# Patient Record
Sex: MALE | Race: WHITE | Employment: STUDENT | URBAN - METROPOLITAN AREA
[De-identification: names, ages, dates, MRNs, and addresses within clinical notes are randomized per-mention and may not be internally consistent; named-entity substitution may affect disease eponyms.]

---
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
## 2018-07-20 ENCOUNTER — TELEPHONE (OUTPATIENT)
Dept: FAMILY MEDICINE CLINIC | Facility: CLINIC | Age: 18
End: 2018-07-20

## 2018-07-20 NOTE — TELEPHONE ENCOUNTER
Pts mother called requesting referral for Dr. Elvira Ayala for Scoliosis.  Pts mother would like a call back from medical staff

## 2019-07-17 ENCOUNTER — TELEPHONE (OUTPATIENT)
Dept: FAMILY MEDICINE CLINIC | Facility: CLINIC | Age: 19
End: 2019-07-17

## 2019-07-18 NOTE — TELEPHONE ENCOUNTER
Spoke with mom states he was at ER last night at [facility] for sore throat.  They ran strep test but came back negative, awaiting lab culture, wanted to see Dr Mat Carranza tomorrow for f/u at 11:00 am.

## 2019-08-07 ENCOUNTER — OFFICE VISIT (OUTPATIENT)
Dept: FAMILY MEDICINE CLINIC | Facility: CLINIC | Age: 19
End: 2019-08-07
Payer: MEDICAID

## 2019-08-07 VITALS
SYSTOLIC BLOOD PRESSURE: 114 MMHG | HEART RATE: 57 BPM | DIASTOLIC BLOOD PRESSURE: 74 MMHG | HEIGHT: 69 IN | WEIGHT: 149.38 LBS | TEMPERATURE: 98 F | BODY MASS INDEX: 22.13 KG/M2

## 2019-08-07 DIAGNOSIS — G89.29 CHRONIC BILATERAL LOW BACK PAIN WITHOUT SCIATICA: Primary | ICD-10-CM

## 2019-08-07 DIAGNOSIS — M54.50 CHRONIC BILATERAL LOW BACK PAIN WITHOUT SCIATICA: Primary | ICD-10-CM

## 2019-08-07 DIAGNOSIS — Z88.9 MULTIPLE ALLERGIES: ICD-10-CM

## 2019-08-07 PROCEDURE — 99214 OFFICE O/P EST MOD 30 MIN: CPT | Performed by: FAMILY MEDICINE

## 2019-08-07 RX ORDER — EPINEPHRINE 0.3 MG/.3ML
INJECTION SUBCUTANEOUS
Qty: 2 EACH | Refills: 0 | Status: SHIPPED | OUTPATIENT
Start: 2019-08-07 | End: 2020-05-29

## 2019-08-12 NOTE — PROGRESS NOTES
HPI:    Fabien Boyle is a 19year old male presents to clinic with concerns regarding back pain. Chronic issue for patient. Denies known trauma/injury.   Almost every morning, patient wakes up with lower back pain, it improves throughout the day but the reviewed. ASSESSMENT/PLAN:   (M54.5,  G89.29) Chronic bilateral low back pain without sciatica  (primary encounter diagnosis)  Plan: PHYSICAL THERAPY EXTERNAL  - normal physical exam. PT referral given. Will follow up if no improvement in 4-6 weeks.

## 2019-08-16 ENCOUNTER — APPOINTMENT (OUTPATIENT)
Dept: LAB | Age: 19
End: 2019-08-16
Attending: FAMILY MEDICINE
Payer: MEDICAID

## 2019-08-16 ENCOUNTER — OFFICE VISIT (OUTPATIENT)
Dept: FAMILY MEDICINE CLINIC | Facility: CLINIC | Age: 19
End: 2019-08-16
Payer: MEDICAID

## 2019-08-16 VITALS
HEART RATE: 59 BPM | RESPIRATION RATE: 18 BRPM | WEIGHT: 146.19 LBS | TEMPERATURE: 98 F | HEIGHT: 69 IN | BODY MASS INDEX: 21.65 KG/M2 | DIASTOLIC BLOOD PRESSURE: 73 MMHG | SYSTOLIC BLOOD PRESSURE: 120 MMHG

## 2019-08-16 DIAGNOSIS — Z00.00 ANNUAL PHYSICAL EXAM: Primary | ICD-10-CM

## 2019-08-16 PROCEDURE — 99395 PREV VISIT EST AGE 18-39: CPT | Performed by: FAMILY MEDICINE

## 2019-08-16 NOTE — PROGRESS NOTES
HPI:    Saumya Madera is a 19year old male presents to clinic for for an annual physical exam.   No acute concerns. .   Normal appetite. Balanced diet. Normal BMs and urination. Sleeps 5-7 hours a night.  Sexually active with multiple partners, uses condom respiratory distress. He has no wheezes. He has no rales. Abdominal: Soft. Bowel sounds are normal. He exhibits no distension. There is no tenderness. There is no rebound and no guarding. Lymphadenopathy:     He has no cervical adenopathy.    Neurologic

## 2020-05-29 ENCOUNTER — OFFICE VISIT (OUTPATIENT)
Dept: FAMILY MEDICINE CLINIC | Facility: CLINIC | Age: 20
End: 2020-05-29
Payer: MEDICAID

## 2020-05-29 VITALS
HEART RATE: 63 BPM | HEIGHT: 69 IN | TEMPERATURE: 97 F | SYSTOLIC BLOOD PRESSURE: 123 MMHG | WEIGHT: 150.25 LBS | DIASTOLIC BLOOD PRESSURE: 79 MMHG | BODY MASS INDEX: 22.25 KG/M2

## 2020-05-29 DIAGNOSIS — L73.9 FOLLICULITIS: Primary | ICD-10-CM

## 2020-05-29 DIAGNOSIS — Z91.010 PEANUT ALLERGY: ICD-10-CM

## 2020-05-29 PROCEDURE — 99214 OFFICE O/P EST MOD 30 MIN: CPT | Performed by: FAMILY MEDICINE

## 2020-05-29 RX ORDER — EPINEPHRINE 0.3 MG/.3ML
INJECTION SUBCUTANEOUS
Qty: 2 EACH | Refills: 0 | Status: SHIPPED | OUTPATIENT
Start: 2020-05-29

## 2020-05-29 NOTE — PROGRESS NOTES
HPI:    Mandeep Bragg is a 22year old male presents to clinic with concerns regarding lesion on his penis. About 1 month back, patient noticed a flat hard lesion on his penis.   Sometimes it appears like it has thick fluid inside that can be expressed, bu follicle. Patient advised against popping it open with a needle/blade. Advised warm compresses and gentle exfoliation.   Follow-up in 1 month if no improvement    (Z91.010) Peanut allergy  Plan:  -EpiPen refilled      Responsible party/patient verbalized

## 2020-12-23 ENCOUNTER — TELEPHONE (OUTPATIENT)
Dept: FAMILY MEDICINE CLINIC | Facility: CLINIC | Age: 20
End: 2020-12-23

## 2020-12-23 NOTE — TELEPHONE ENCOUNTER
Pt states he sees worms in his stool. Pt denies blood in stool, dark stool, diarrhea, fever, abdominal pain,    Pt states at times has nausea, denies emesis. Advised OV today and pt agrees to plan.     OV scheduled with Dr Isiah Sinclair for further eval

## 2020-12-23 NOTE — TELEPHONE ENCOUNTER
Patient cancelled appointment for 4:30pm today with Dr Shree Lee indicated that forgot had another appointment and would not make it in time. Advised patient that can go to urgent care to get evaluated today. Patient agreed.

## 2021-06-09 ENCOUNTER — OFFICE VISIT (OUTPATIENT)
Dept: FAMILY MEDICINE CLINIC | Facility: CLINIC | Age: 21
End: 2021-06-09
Payer: MEDICAID

## 2021-06-09 ENCOUNTER — LAB ENCOUNTER (OUTPATIENT)
Dept: LAB | Age: 21
End: 2021-06-09
Attending: FAMILY MEDICINE
Payer: MEDICAID

## 2021-06-09 VITALS
WEIGHT: 181 LBS | HEART RATE: 60 BPM | HEIGHT: 69 IN | DIASTOLIC BLOOD PRESSURE: 77 MMHG | BODY MASS INDEX: 26.81 KG/M2 | SYSTOLIC BLOOD PRESSURE: 118 MMHG

## 2021-06-09 DIAGNOSIS — Z00.00 ANNUAL PHYSICAL EXAM: Primary | ICD-10-CM

## 2021-06-09 DIAGNOSIS — Z86.19 HISTORY OF CHLAMYDIA: ICD-10-CM

## 2021-06-09 DIAGNOSIS — Z00.00 ANNUAL PHYSICAL EXAM: ICD-10-CM

## 2021-06-09 DIAGNOSIS — M41.9 SCOLIOSIS, UNSPECIFIED SCOLIOSIS TYPE, UNSPECIFIED SPINAL REGION: ICD-10-CM

## 2021-06-09 PROCEDURE — 3074F SYST BP LT 130 MM HG: CPT | Performed by: FAMILY MEDICINE

## 2021-06-09 PROCEDURE — 3078F DIAST BP <80 MM HG: CPT | Performed by: FAMILY MEDICINE

## 2021-06-09 PROCEDURE — 80061 LIPID PANEL: CPT

## 2021-06-09 PROCEDURE — 3008F BODY MASS INDEX DOCD: CPT | Performed by: FAMILY MEDICINE

## 2021-06-09 PROCEDURE — 90471 IMMUNIZATION ADMIN: CPT | Performed by: FAMILY MEDICINE

## 2021-06-09 PROCEDURE — 36415 COLL VENOUS BLD VENIPUNCTURE: CPT

## 2021-06-09 PROCEDURE — 99395 PREV VISIT EST AGE 18-39: CPT | Performed by: FAMILY MEDICINE

## 2021-06-09 PROCEDURE — 87591 N.GONORRHOEAE DNA AMP PROB: CPT

## 2021-06-09 PROCEDURE — 87491 CHLMYD TRACH DNA AMP PROBE: CPT

## 2021-06-09 PROCEDURE — 90651 9VHPV VACCINE 2/3 DOSE IM: CPT | Performed by: FAMILY MEDICINE

## 2021-06-09 PROCEDURE — 90472 IMMUNIZATION ADMIN EACH ADD: CPT | Performed by: FAMILY MEDICINE

## 2021-06-09 PROCEDURE — 80053 COMPREHEN METABOLIC PANEL: CPT

## 2021-06-09 PROCEDURE — 90715 TDAP VACCINE 7 YRS/> IM: CPT | Performed by: FAMILY MEDICINE

## 2021-06-09 NOTE — PROGRESS NOTES
HPI:    Saumya Madera is a 23year old male presents to clinic for annual physical exam.  History of scoliosis, gets frequent back pains during exercise. Is interested in seeing a physical therapist.  No other concerns.   Normal appetite, tries eat healthy equal, round, and reactive to light. Neck:      Thyroid: No thyromegaly. Cardiovascular:      Rate and Rhythm: Normal rate and regular rhythm. Heart sounds: Normal heart sounds. No murmur heard.      Pulmonary:      Effort: Pulmonary effort is norm PERTUSIS VACCINE (TDAP), >7 YEARS, IM USE  HPV HUMAN PAPILLOMA VIRUS VACC 9 JEAN 3 DOSE IM  PHYSICAL THERAPY - INTERNAL       This note was created by [employer] voice recognition.  Errors in content may be related to improper recognition by the system; efforts

## 2021-07-19 ENCOUNTER — MED REC SCAN ONLY (OUTPATIENT)
Dept: FAMILY MEDICINE CLINIC | Facility: CLINIC | Age: 21
End: 2021-07-19

## 2021-07-26 ENCOUNTER — NURSE TRIAGE (OUTPATIENT)
Dept: FAMILY MEDICINE CLINIC | Facility: CLINIC | Age: 21
End: 2021-07-26

## 2021-07-26 NOTE — TELEPHONE ENCOUNTER
Action Requested: Summary for Provider     []  Critical Lab, Recommendations Needed  [x] Need Additional Advice  []   FYI    []   Need Orders  [] Need Medications Sent to Pharmacy  []  Other     SUMMARY: Patient (with mom also on the line) stated that he n

## 2021-07-29 ENCOUNTER — OFFICE VISIT (OUTPATIENT)
Dept: FAMILY MEDICINE CLINIC | Facility: CLINIC | Age: 21
End: 2021-07-29
Payer: MEDICAID

## 2021-07-29 VITALS
RESPIRATION RATE: 16 BRPM | HEART RATE: 50 BPM | WEIGHT: 173 LBS | BODY MASS INDEX: 26 KG/M2 | TEMPERATURE: 98 F | SYSTOLIC BLOOD PRESSURE: 114 MMHG | DIASTOLIC BLOOD PRESSURE: 72 MMHG

## 2021-07-29 DIAGNOSIS — R19.5 CHANGE IN STOOL: Primary | ICD-10-CM

## 2021-07-29 LAB — IGA SERPL-MCNC: 139 MG/DL (ref 70–312)

## 2021-07-29 PROCEDURE — 3078F DIAST BP <80 MM HG: CPT | Performed by: FAMILY MEDICINE

## 2021-07-29 PROCEDURE — 3074F SYST BP LT 130 MM HG: CPT | Performed by: FAMILY MEDICINE

## 2021-07-29 PROCEDURE — 99213 OFFICE O/P EST LOW 20 MIN: CPT | Performed by: FAMILY MEDICINE

## 2021-07-29 NOTE — PROGRESS NOTES
HPI: Bhavesh Camarillo is a 23year old male who presents for complaints of worms in his stool. Noticed about one week ago. Has seen them a few times since then. Notices them more after a night of drinking. Not mobile. Sees them in the toilet bowl.  No blood in stool

## 2021-07-30 LAB — TTG IGA SER-ACNC: 0.2 U/ML (ref ?–7)

## 2021-08-17 ENCOUNTER — HOSPITAL ENCOUNTER (OUTPATIENT)
Age: 21
Discharge: HOME | End: 2021-08-17
Payer: MEDICAID

## 2021-08-17 ENCOUNTER — LAB ENCOUNTER (OUTPATIENT)
Dept: LAB | Age: 21
End: 2021-08-17
Attending: FAMILY MEDICINE
Payer: MEDICAID

## 2021-08-17 VITALS
RESPIRATION RATE: 20 BRPM | HEART RATE: 78 BPM | DIASTOLIC BLOOD PRESSURE: 72 MMHG | TEMPERATURE: 99 F | OXYGEN SATURATION: 98 % | SYSTOLIC BLOOD PRESSURE: 130 MMHG

## 2021-08-17 DIAGNOSIS — R19.5 CHANGE IN STOOL: ICD-10-CM

## 2021-08-17 DIAGNOSIS — J02.0 STREPTOCOCCAL SORE THROAT: Primary | ICD-10-CM

## 2021-08-17 DIAGNOSIS — Z20.822 LAB TEST NEGATIVE FOR COVID-19 VIRUS: ICD-10-CM

## 2021-08-17 LAB
HEMOCCULT STL QL: NEGATIVE
S PYO AG THROAT QL: POSITIVE
SARS-COV-2 RNA RESP QL NAA+PROBE: NOT DETECTED

## 2021-08-17 PROCEDURE — 87427 SHIGA-LIKE TOXIN AG IA: CPT

## 2021-08-17 PROCEDURE — 87338 HPYLORI STOOL AG IA: CPT

## 2021-08-17 PROCEDURE — 99203 OFFICE O/P NEW LOW 30 MIN: CPT | Performed by: NURSE PRACTITIONER

## 2021-08-17 PROCEDURE — 87272 CRYPTOSPORIDIUM AG IF: CPT

## 2021-08-17 PROCEDURE — 87329 GIARDIA AG IA: CPT

## 2021-08-17 PROCEDURE — 87046 STOOL CULTR AEROBIC BACT EA: CPT

## 2021-08-17 PROCEDURE — 87880 STREP A ASSAY W/OPTIC: CPT | Performed by: NURSE PRACTITIONER

## 2021-08-17 PROCEDURE — U0002 COVID-19 LAB TEST NON-CDC: HCPCS | Performed by: NURSE PRACTITIONER

## 2021-08-17 PROCEDURE — 82274 ASSAY TEST FOR BLOOD FECAL: CPT

## 2021-08-17 PROCEDURE — 87045 FECES CULTURE AEROBIC BACT: CPT

## 2021-08-17 RX ORDER — AMOXICILLIN 875 MG/1
875 TABLET, COATED ORAL 2 TIMES DAILY
Qty: 20 TABLET | Refills: 0 | Status: SHIPPED | OUTPATIENT
Start: 2021-08-17 | End: 2021-08-27

## 2021-08-17 NOTE — ED PROVIDER NOTES
Patient presents with:  Sore Throat      HPI:     Kaitlynn Neely is a 23year old male who presents for sore throat, nasal congestion, and a dry cough for the past few days. No fevers or chills. No difficulty breathing. No chest pain.   No difficulty Food in the Last Year:   Transportation Needs:       Lack of Transportation (Medical):       Lack of Transportation (Non-Medical):   Physical Activity:       Days of Exercise per Week:       Minutes of Exercise per Session:   Stress:       Feeling of Stres past 10 hour(s))   POCT Rapid Strep    Collection Time: 08/17/21  4:38 PM   Result Value Ref Range    POCT Rapid Strep Positive (A) Negative   Rapid SARS-CoV-2 by PCR    Collection Time: 08/17/21  4:42 PM    Specimen: Nares;  Other   Result Value Ref Range

## 2021-08-18 LAB
CRYPTOSP AG STL QL IA: NEGATIVE
G LAMBLIA AG STL QL IA: NEGATIVE

## 2021-08-20 LAB — H PYLORI AG STL QL IA: NEGATIVE

## 2021-12-30 ENCOUNTER — TELEPHONE (OUTPATIENT)
Dept: FAMILY MEDICINE CLINIC | Facility: CLINIC | Age: 21
End: 2021-12-30

## 2021-12-30 NOTE — TELEPHONE ENCOUNTER
Patient would like to know if the Izquierdo Peter medication is available and if someone has Covid if this would help.

## 2021-12-31 NOTE — TELEPHONE ENCOUNTER
Pt called regarding message below  Stated that's in correct, was calling about stool result  For worms from awhile back- check Lab results, advised from labs results 8/17/21 Dr Teetee Bean ([address] [place] [address] Avenue 7/29/21) stated no parasites, Pt verbalized understanding    Asking if medication for worms can be prescribed for precautionary reasons

## 2022-01-05 NOTE — TELEPHONE ENCOUNTER
2nd call attempt. Orderlord message also sent. Left message on voicemail to call office for further assistance.

## 2022-01-17 ENCOUNTER — TELEPHONE (OUTPATIENT)
Dept: FAMILY MEDICINE CLINIC | Facility: CLINIC | Age: 22
End: 2022-01-17

## 2022-01-17 NOTE — TELEPHONE ENCOUNTER
Reported that he went to  [facility] ER 2 weeks ago  and was dx left ear infection and was prescribed with ear drops,states that his symptoms get better but now return again,not fully stopped, requesting oral antibiotic, informed that he needs to schedule a fol

## 2022-02-01 ENCOUNTER — TELEMEDICINE (OUTPATIENT)
Dept: FAMILY MEDICINE CLINIC | Facility: CLINIC | Age: 22
End: 2022-02-01
Payer: MEDICAID

## 2022-02-01 DIAGNOSIS — H65.192 OTHER NON-RECURRENT ACUTE NONSUPPURATIVE OTITIS MEDIA OF LEFT EAR: Primary | ICD-10-CM

## 2022-02-01 PROCEDURE — 99213 OFFICE O/P EST LOW 20 MIN: CPT | Performed by: FAMILY MEDICINE

## 2022-02-01 RX ORDER — AMOXICILLIN AND CLAVULANATE POTASSIUM 875; 125 MG/1; MG/1
1 TABLET, FILM COATED ORAL 2 TIMES DAILY
Qty: 14 TABLET | Refills: 0 | Status: SHIPPED | OUTPATIENT
Start: 2022-02-01 | End: 2022-02-08

## 2022-03-03 ENCOUNTER — OFFICE VISIT (OUTPATIENT)
Dept: FAMILY MEDICINE CLINIC | Facility: CLINIC | Age: 22
End: 2022-03-03
Payer: MEDICAID

## 2022-03-03 VITALS
DIASTOLIC BLOOD PRESSURE: 76 MMHG | BODY MASS INDEX: 25.92 KG/M2 | HEART RATE: 84 BPM | WEIGHT: 175 LBS | HEIGHT: 69 IN | SYSTOLIC BLOOD PRESSURE: 132 MMHG

## 2022-03-03 DIAGNOSIS — S01.511D: ICD-10-CM

## 2022-03-03 DIAGNOSIS — Z48.02 ENCOUNTER FOR REMOVAL OF SUTURES: Primary | ICD-10-CM

## 2022-03-03 PROCEDURE — 3078F DIAST BP <80 MM HG: CPT | Performed by: PHYSICIAN ASSISTANT

## 2022-03-03 PROCEDURE — 3008F BODY MASS INDEX DOCD: CPT | Performed by: PHYSICIAN ASSISTANT

## 2022-03-03 PROCEDURE — 3075F SYST BP GE 130 - 139MM HG: CPT | Performed by: PHYSICIAN ASSISTANT

## 2022-03-03 PROCEDURE — 99213 OFFICE O/P EST LOW 20 MIN: CPT | Performed by: PHYSICIAN ASSISTANT

## 2023-01-23 ENCOUNTER — TELEPHONE (OUTPATIENT)
Dept: FAMILY MEDICINE CLINIC | Facility: CLINIC | Age: 23
End: 2023-01-23

## 2023-01-23 ENCOUNTER — OFFICE VISIT (OUTPATIENT)
Dept: FAMILY MEDICINE CLINIC | Facility: CLINIC | Age: 23
End: 2023-01-23

## 2023-01-23 VITALS
HEIGHT: 69 IN | RESPIRATION RATE: 18 BRPM | SYSTOLIC BLOOD PRESSURE: 114 MMHG | OXYGEN SATURATION: 98 % | HEART RATE: 78 BPM | BODY MASS INDEX: 25.92 KG/M2 | DIASTOLIC BLOOD PRESSURE: 78 MMHG | WEIGHT: 175 LBS

## 2023-01-23 DIAGNOSIS — M41.9 SCOLIOSIS, UNSPECIFIED SCOLIOSIS TYPE, UNSPECIFIED SPINAL REGION: ICD-10-CM

## 2023-01-23 DIAGNOSIS — L01.00 IMPETIGO: ICD-10-CM

## 2023-01-23 DIAGNOSIS — Z00.00 ANNUAL PHYSICAL EXAM: Primary | ICD-10-CM

## 2023-01-23 RX ORDER — EPINEPHRINE 0.3 MG/.3ML
INJECTION SUBCUTANEOUS
Qty: 2 EACH | Refills: 0 | Status: SHIPPED | OUTPATIENT
Start: 2023-01-23

## 2023-01-23 NOTE — TELEPHONE ENCOUNTER
Patients mother called and stated that the patient will be seeing a doctor at [facility] and she wants to know if there can be a referral written for him to see a spine or orthopedic doctor there instead.

## 2023-01-27 ENCOUNTER — TELEPHONE (OUTPATIENT)
Dept: CASE MANAGEMENT | Age: 23
End: 2023-01-27

## 2023-01-27 DIAGNOSIS — M41.9 SCOLIOSIS, UNSPECIFIED SCOLIOSIS TYPE, UNSPECIFIED SPINAL REGION: Primary | ICD-10-CM

## 2023-01-27 NOTE — TELEPHONE ENCOUNTER
Dr. Yasmin Saldana,     Patient has Medicaid and needs order faxed to :    [facility]     DX:   Scoliosis, unspecified scoliosis type, unspecified spinal region    Neurosurgery: On demand clinic no specific specialist    Fax [phone]    Phone [phone]    Pended referral please review diagnosis and sign off if you agree. Thank you.   Miguel A Fink

## 2023-02-28 NOTE — TELEPHONE ENCOUNTER
Mom requesting referral mailed to her home address, spine specialist still has not received patients referral via fax and it has been faxed twice, spine specialist also provided an e-mail address to e-mail referral. Please advise. [email or web address]

## 2023-07-01 NOTE — TELEPHONE ENCOUNTER
Patient mom requesting an order to have a X-ray of the spine - Taken at a facility on the [facility] area near their home. Trend troponins  Telemetry monitoring  Cardiology consult

## 2023-08-08 ENCOUNTER — OFFICE VISIT (OUTPATIENT)
Dept: FAMILY MEDICINE CLINIC | Facility: CLINIC | Age: 23
End: 2023-08-08

## 2023-08-08 VITALS
HEART RATE: 57 BPM | OXYGEN SATURATION: 98 % | WEIGHT: 156 LBS | TEMPERATURE: 98 F | SYSTOLIC BLOOD PRESSURE: 118 MMHG | BODY MASS INDEX: 23.11 KG/M2 | HEIGHT: 69 IN | DIASTOLIC BLOOD PRESSURE: 62 MMHG

## 2023-08-08 DIAGNOSIS — M41.9 SCOLIOSIS, UNSPECIFIED SCOLIOSIS TYPE, UNSPECIFIED SPINAL REGION: Primary | ICD-10-CM

## 2023-08-08 DIAGNOSIS — R19.7 DIARRHEA, UNSPECIFIED TYPE: ICD-10-CM

## 2023-08-08 DIAGNOSIS — L30.9 ECZEMA, UNSPECIFIED TYPE: ICD-10-CM

## 2023-08-08 PROCEDURE — 3078F DIAST BP <80 MM HG: CPT

## 2023-08-08 PROCEDURE — 3008F BODY MASS INDEX DOCD: CPT

## 2023-08-08 PROCEDURE — 3074F SYST BP LT 130 MM HG: CPT

## 2023-08-08 PROCEDURE — 99204 OFFICE O/P NEW MOD 45 MIN: CPT

## 2024-02-21 ENCOUNTER — OFFICE VISIT (OUTPATIENT)
Dept: FAMILY MEDICINE CLINIC | Facility: CLINIC | Age: 24
End: 2024-02-21

## 2024-02-21 ENCOUNTER — LAB ENCOUNTER (OUTPATIENT)
Dept: LAB | Age: 24
End: 2024-02-21
Attending: FAMILY MEDICINE
Payer: MEDICAID

## 2024-02-21 VITALS
HEART RATE: 86 BPM | DIASTOLIC BLOOD PRESSURE: 81 MMHG | SYSTOLIC BLOOD PRESSURE: 128 MMHG | RESPIRATION RATE: 17 BRPM | WEIGHT: 151 LBS | BODY MASS INDEX: 22 KG/M2 | OXYGEN SATURATION: 98 %

## 2024-02-21 DIAGNOSIS — F32.A DEPRESSION, UNSPECIFIED DEPRESSION TYPE: ICD-10-CM

## 2024-02-21 DIAGNOSIS — Z88.9 MULTIPLE ALLERGIES: ICD-10-CM

## 2024-02-21 DIAGNOSIS — Z00.00 ANNUAL PHYSICAL EXAM: ICD-10-CM

## 2024-02-21 DIAGNOSIS — Z00.00 ANNUAL PHYSICAL EXAM: Primary | ICD-10-CM

## 2024-02-21 LAB
ANION GAP SERPL CALC-SCNC: 4 MMOL/L (ref 0–18)
BUN BLD-MCNC: 14 MG/DL (ref 9–23)
BUN/CREAT SERPL: 11.7 (ref 10–20)
CALCIUM BLD-MCNC: 9.4 MG/DL (ref 8.7–10.4)
CHLORIDE SERPL-SCNC: 107 MMOL/L (ref 98–112)
CHOLEST SERPL-MCNC: 157 MG/DL (ref ?–200)
CO2 SERPL-SCNC: 31 MMOL/L (ref 21–32)
CREAT BLD-MCNC: 1.2 MG/DL
EGFRCR SERPLBLD CKD-EPI 2021: 86 ML/MIN/1.73M2 (ref 60–?)
FASTING PATIENT LIPID ANSWER: YES
FASTING STATUS PATIENT QL REPORTED: YES
GLUCOSE BLD-MCNC: 97 MG/DL (ref 70–99)
HDLC SERPL-MCNC: 68 MG/DL (ref 40–59)
LDLC SERPL CALC-MCNC: 78 MG/DL (ref ?–100)
NONHDLC SERPL-MCNC: 89 MG/DL (ref ?–130)
OSMOLALITY SERPL CALC.SUM OF ELEC: 294 MOSM/KG (ref 275–295)
POTASSIUM SERPL-SCNC: 4.2 MMOL/L (ref 3.5–5.1)
SODIUM SERPL-SCNC: 142 MMOL/L (ref 136–145)
TRIGL SERPL-MCNC: 54 MG/DL (ref 30–149)
TSI SER-ACNC: 2.82 MIU/ML (ref 0.55–4.78)
VLDLC SERPL CALC-MCNC: 8 MG/DL (ref 0–30)

## 2024-02-21 PROCEDURE — 99395 PREV VISIT EST AGE 18-39: CPT | Performed by: FAMILY MEDICINE

## 2024-02-21 PROCEDURE — 84443 ASSAY THYROID STIM HORMONE: CPT

## 2024-02-21 PROCEDURE — 80048 BASIC METABOLIC PNL TOTAL CA: CPT

## 2024-02-21 PROCEDURE — 80061 LIPID PANEL: CPT

## 2024-02-21 PROCEDURE — 36415 COLL VENOUS BLD VENIPUNCTURE: CPT

## 2024-02-21 RX ORDER — EPINEPHRINE 0.3 MG/.3ML
INJECTION SUBCUTANEOUS
Qty: 2 EACH | Refills: 0 | Status: SHIPPED | OUTPATIENT
Start: 2024-02-21

## 2024-02-21 NOTE — PROGRESS NOTES
HPI:    Kash Azul is a 23 year old male presents clinic for annual physical exam.   Overall, feels well.  No acute concerns.  Normal appetite, tries eat healthy foods.  Does not exercise much.  Variable sleep habits.  Normal urination.  Sexually active, denies new partners    HISTORY:  Past Medical History:   Diagnosis Date    Allergic rhinitis     Peanut allergy     Scoliosis 2016      No past surgical history on file.   No family history on file.   Social History:   Social History     Socioeconomic History    Marital status: Single   Tobacco Use    Smoking status: Never    Smokeless tobacco: Never   Vaping Use    Vaping Use: Never used   Substance and Sexual Activity    Alcohol use: Yes    Drug use: No    Sexual activity: Yes     Partners: Female   Other Topics Concern    Caffeine Concern Yes     Comment: soda, 1cup/day        Medications (Active prior to today's visit):  Current Outpatient Medications   Medication Sig Dispense Refill    EPINEPHrine (EPIPEN 2-MARCIA) 0.3 MG/0.3ML Injection Solution Auto-injector inject 1 by Intramuscular route 2 each 0       Allergies:  Allergies   Allergen Reactions    Banana      Other reaction(s): BANANA    Mold UNKNOWN    Peanuts HIVES    Seasonal ITCHING         Depression Screening (PHQ-2/PHQ-9): Over the LAST 2 WEEKS   Little interest or pleasure in doing things: More than half the days    Feeling down, depressed, or hopeless: Several days    PHQ-2 SCORE: 3   1. Little interest or pleasure in doing things: More than half the days  2. Feeling down, depressed, or hopeless: Several days  3. Trouble falling or staying asleep, or sleeping too much: Not at all  4. Feeling tired or having little energy: Several days  5. Poor appetite or overeating: Not at all  6. Feeling bad about yourself - or that you are a failure or have let yourself or your family down: Several days  7. Trouble concentrating on things, such as reading the newspaper or watching television: Not at  all  8. Moving or speaking so slowly that other people could have noticed. Or the opposite - being so fidgety or restless that you have been moving around a lot more than usual: Not at all  9. Thoughts that you would be better off dead, or of hurting yourself in some way: Not at all  PHQ-9 TOTAL SCORE: 5            ROS:   Review of Systems   All other systems reviewed and are negative.      PHYSICAL EXAM:     Vitals:    02/21/24 1412   BP: 128/81   BP Location: Right arm   Patient Position: Sitting   Cuff Size: adult   Pulse: 86   Resp: 17   SpO2: 98%   Weight: 151 lb (68.5 kg)     Physical Exam  Vitals reviewed.   Constitutional:       General: He is not in acute distress.  HENT:      Head: Normocephalic and atraumatic.      Right Ear: Tympanic membrane, ear canal and external ear normal.      Left Ear: Tympanic membrane, ear canal and external ear normal.      Nose: Nose normal.      Mouth/Throat:      Pharynx: Uvula midline.   Eyes:      Conjunctiva/sclera: Conjunctivae normal.      Pupils: Pupils are equal, round, and reactive to light.   Neck:      Thyroid: No thyromegaly.   Cardiovascular:      Rate and Rhythm: Normal rate and regular rhythm.      Heart sounds: Normal heart sounds. No murmur heard.  Pulmonary:      Effort: Pulmonary effort is normal. No respiratory distress.      Breath sounds: Normal breath sounds. No wheezing or rales.   Abdominal:      General: Bowel sounds are normal. There is no distension.      Palpations: Abdomen is soft.      Tenderness: There is no abdominal tenderness. There is no guarding or rebound.   Musculoskeletal:      Cervical back: Normal range of motion and neck supple.   Lymphadenopathy:      Cervical: No cervical adenopathy.   Neurological:      Mental Status: He is alert.         ASSESSMENT/PLAN:   (Z00.00) Annual physical exam  (primary encounter diagnosis)  Plan: Lipid Panel [E], TSH W Reflex To Free T4 [E],         Basic Metabolic Panel (8) [E]  - Gardasil-3 given,  otherwise up-to-date  - safe sexual practices encouraged   - Reinforced healthy diet, lifestyle, and exercise.  - Past Medical/Social/Family histories reviewed  - Regular dental visits recommended   - Regular eye exams recommended         Follow up in 1 year or sooner if needed      (Z88.9) Multiple allergies  Plan: EPINEPHrine (EPIPEN 2-MARCIA) 0.3 MG/0.3ML         Injection Solution Auto-injector  - Allergies reviewed with patient.Epi-Pen refilled    (F32.A) Depression, unspecified depression type  Plan: [facility] Referral - In Network  PHQ-5. No acute symptoms - denies thoughts of elf harm, harming others. Could benefit from speaking to a therapist, [facility] referral placed.              Responsible party/patient verbalized understanding of information discussed. No barriers to learning observed.          Orders This Visit:  Orders Placed This Encounter   Procedures    Lipid Panel [E]    TSH W Reflex To Free T4 [E]    Basic Metabolic Panel (8) [E]    GARDASIL 9       Meds This Visit:  Requested Prescriptions     Signed Prescriptions Disp Refills    EPINEPHrine (EPIPEN 2-MARCIA) 0.3 MG/0.3ML Injection Solution Auto-injector 2 each 0     Sig: inject 1 by Intramuscular route       Imaging & Referrals:  HPV HUMAN PAPILLOMA VIRUS VACC 9 JEAN 3 DOSE IM  OP REFERRAL TO [facility]       The 21st Century cures Act makes medical notes like these available to patients in the interest of transparency.  However, be advised that this is a medical document.  It is intended as peer to peer communication.  It is written in medical language and may contain abbreviations or verbiage that are unfamiliar.  It may appear blunt or direct.  Medical documents are intended to carry relevant information, facts as evident, and the clinical opinion of the practitioner.      This note was created by [employer] voice recognition. Errors in content may be related to improper recognition by the system; efforts to review and correct have been done but errors may  still exist. Please contact me with any questions.       2/21/2024  Billy Wagoner MD

## 2024-02-29 ENCOUNTER — APPOINTMENT (OUTPATIENT)
Dept: URBAN - METROPOLITAN AREA CLINIC 244 | Age: 24
Setting detail: DERMATOLOGY
End: 2024-03-01

## 2024-02-29 DIAGNOSIS — L30.9 DERMATITIS, UNSPECIFIED: ICD-10-CM

## 2024-02-29 PROCEDURE — OTHER MIPS QUALITY: OTHER

## 2024-02-29 PROCEDURE — OTHER COUNSELING: OTHER

## 2024-02-29 PROCEDURE — 99204 OFFICE O/P NEW MOD 45 MIN: CPT

## 2024-02-29 PROCEDURE — OTHER PRESCRIPTION MEDICATION MANAGEMENT: OTHER

## 2024-02-29 PROCEDURE — OTHER PRESCRIPTION: OTHER

## 2024-02-29 RX ORDER — HYDROCORTISONE 25 MG/G
CREAM TOPICAL
Qty: 28 | Refills: 0 | Status: ERX | COMMUNITY
Start: 2024-02-29

## 2024-02-29 ASSESSMENT — LOCATION ZONE DERM: LOCATION ZONE: NOSE

## 2024-02-29 ASSESSMENT — LOCATION SIMPLE DESCRIPTION DERM: LOCATION SIMPLE: NOSE

## 2024-02-29 ASSESSMENT — LOCATION DETAILED DESCRIPTION DERM: LOCATION DETAILED: NASAL ROOT

## 2024-02-29 ASSESSMENT — BSA RASH: BSA RASH: 1

## 2024-02-29 ASSESSMENT — ITCH NUMERIC RATING SCALE: HOW SEVERE IS YOUR ITCHING?: 3

## 2024-02-29 NOTE — PROCEDURE: PRESCRIPTION MEDICATION MANAGEMENT
Detail Level: Zone
Initiate Treatment: Hydrocortisone BID max 3 weeks then Vaseline
Render In Strict Bullet Format?: No
Plan: Discussed f/u with allergist if persists

## 2024-07-16 ENCOUNTER — TELEPHONE (OUTPATIENT)
Dept: FAMILY MEDICINE CLINIC | Facility: CLINIC | Age: 24
End: 2024-07-16

## 2024-07-16 DIAGNOSIS — M41.9 SCOLIOSIS, UNSPECIFIED SCOLIOSIS TYPE, UNSPECIFIED SPINAL REGION: Primary | ICD-10-CM

## 2024-07-16 DIAGNOSIS — L30.9 ECZEMA, UNSPECIFIED TYPE: Primary | ICD-10-CM

## 2024-07-16 NOTE — TELEPHONE ENCOUNTER
Patient is requesting referral.     Name of specialist and specialty department : [facility]   Reason for visit with the specialist: Follow up   Address of the specialist office: n/a   Appointment date: n/a          CSS informed patient the turnaround time for referral is 5-7 business days.  Patient was informed to check their [employer] account for referral status.

## 2024-07-16 NOTE — TELEPHONE ENCOUNTER
Called mom, gathered more information about request     She states [facility] PT  See previous order 8/8/23      Pended

## 2024-07-16 NOTE — TELEPHONE ENCOUNTER
Patients mother called and is asking physical therapy referral for patients back ongoing jude ferguson.

## 2024-12-03 ENCOUNTER — OFFICE VISIT (OUTPATIENT)
Dept: FAMILY MEDICINE CLINIC | Facility: CLINIC | Age: 24
End: 2024-12-03

## 2024-12-03 ENCOUNTER — LAB ENCOUNTER (OUTPATIENT)
Dept: LAB | Age: 24
End: 2024-12-03
Attending: FAMILY MEDICINE
Payer: MEDICAID

## 2024-12-03 VITALS
SYSTOLIC BLOOD PRESSURE: 116 MMHG | OXYGEN SATURATION: 98 % | HEIGHT: 69 IN | HEART RATE: 55 BPM | TEMPERATURE: 98 F | DIASTOLIC BLOOD PRESSURE: 74 MMHG | RESPIRATION RATE: 18 BRPM | WEIGHT: 148 LBS | BODY MASS INDEX: 21.92 KG/M2

## 2024-12-03 DIAGNOSIS — R10.13 EPIGASTRIC PAIN: ICD-10-CM

## 2024-12-03 DIAGNOSIS — M54.50 CHRONIC MIDLINE LOW BACK PAIN WITHOUT SCIATICA: ICD-10-CM

## 2024-12-03 DIAGNOSIS — Z00.00 ROUTINE PHYSICAL EXAMINATION: ICD-10-CM

## 2024-12-03 DIAGNOSIS — Z00.00 ROUTINE PHYSICAL EXAMINATION: Primary | ICD-10-CM

## 2024-12-03 DIAGNOSIS — Z28.21 INFLUENZA VACCINATION DECLINED BY PATIENT: ICD-10-CM

## 2024-12-03 DIAGNOSIS — Z13.220 LIPID SCREENING: ICD-10-CM

## 2024-12-03 DIAGNOSIS — R19.5 LOOSE STOOLS: ICD-10-CM

## 2024-12-03 DIAGNOSIS — R53.83 FATIGUE, UNSPECIFIED TYPE: ICD-10-CM

## 2024-12-03 DIAGNOSIS — Q72.812 SHORTENING, LEG, CONGENITAL, LEFT: ICD-10-CM

## 2024-12-03 DIAGNOSIS — G89.29 CHRONIC MIDLINE LOW BACK PAIN WITHOUT SCIATICA: ICD-10-CM

## 2024-12-03 DIAGNOSIS — M41.9 SCOLIOSIS OF THORACOLUMBAR SPINE, UNSPECIFIED SCOLIOSIS TYPE: ICD-10-CM

## 2024-12-03 LAB
ALBUMIN SERPL-MCNC: 4.9 G/DL (ref 3.2–4.8)
ALBUMIN/GLOB SERPL: 2.2 {RATIO} (ref 1–2)
ALP LIVER SERPL-CCNC: 36 U/L
ALT SERPL-CCNC: 17 U/L
ANION GAP SERPL CALC-SCNC: 6 MMOL/L (ref 0–18)
AST SERPL-CCNC: 18 U/L (ref ?–34)
BASOPHILS # BLD AUTO: 0.02 X10(3) UL (ref 0–0.2)
BASOPHILS NFR BLD AUTO: 0.4 %
BILIRUB SERPL-MCNC: 0.8 MG/DL (ref 0.3–1.2)
BILIRUB UR QL: NEGATIVE
BUN BLD-MCNC: 14 MG/DL (ref 9–23)
BUN/CREAT SERPL: 11.4 (ref 10–20)
CALCIUM BLD-MCNC: 10.2 MG/DL (ref 8.7–10.4)
CHLORIDE SERPL-SCNC: 106 MMOL/L (ref 98–112)
CHOLEST SERPL-MCNC: 134 MG/DL (ref ?–200)
CLARITY UR: CLEAR
CO2 SERPL-SCNC: 31 MMOL/L (ref 21–32)
CREAT BLD-MCNC: 1.23 MG/DL
DEPRECATED RDW RBC AUTO: 39.9 FL (ref 35.1–46.3)
EGFRCR SERPLBLD CKD-EPI 2021: 84 ML/MIN/1.73M2 (ref 60–?)
EOSINOPHIL # BLD AUTO: 0.07 X10(3) UL (ref 0–0.7)
EOSINOPHIL NFR BLD AUTO: 1.3 %
ERYTHROCYTE [DISTWIDTH] IN BLOOD BY AUTOMATED COUNT: 12.4 % (ref 11–15)
FASTING PATIENT LIPID ANSWER: YES
FASTING STATUS PATIENT QL REPORTED: YES
GLOBULIN PLAS-MCNC: 2.2 G/DL (ref 2–3.5)
GLUCOSE BLD-MCNC: 88 MG/DL (ref 70–99)
GLUCOSE UR-MCNC: NORMAL MG/DL
HCT VFR BLD AUTO: 45.6 %
HDLC SERPL-MCNC: 63 MG/DL (ref 40–59)
HGB BLD-MCNC: 16.1 G/DL
HGB UR QL STRIP.AUTO: NEGATIVE
IMM GRANULOCYTES # BLD AUTO: 0.01 X10(3) UL (ref 0–1)
IMM GRANULOCYTES NFR BLD: 0.2 %
KETONES UR-MCNC: NEGATIVE MG/DL
LDLC SERPL CALC-MCNC: 58 MG/DL (ref ?–100)
LEUKOCYTE ESTERASE UR QL STRIP.AUTO: NEGATIVE
LYMPHOCYTES # BLD AUTO: 1.89 X10(3) UL (ref 1–4)
LYMPHOCYTES NFR BLD AUTO: 34.9 %
MCH RBC QN AUTO: 31 PG (ref 26–34)
MCHC RBC AUTO-ENTMCNC: 35.3 G/DL (ref 31–37)
MCV RBC AUTO: 87.7 FL
MONOCYTES # BLD AUTO: 0.33 X10(3) UL (ref 0.1–1)
MONOCYTES NFR BLD AUTO: 6.1 %
NEUTROPHILS # BLD AUTO: 3.09 X10 (3) UL (ref 1.5–7.7)
NEUTROPHILS # BLD AUTO: 3.09 X10(3) UL (ref 1.5–7.7)
NEUTROPHILS NFR BLD AUTO: 57.1 %
NITRITE UR QL STRIP.AUTO: NEGATIVE
NONHDLC SERPL-MCNC: 71 MG/DL (ref ?–130)
OSMOLALITY SERPL CALC.SUM OF ELEC: 296 MOSM/KG (ref 275–295)
PH UR: 6 [PH] (ref 5–8)
PLATELET # BLD AUTO: 213 10(3)UL (ref 150–450)
POTASSIUM SERPL-SCNC: 4.1 MMOL/L (ref 3.5–5.1)
PROT SERPL-MCNC: 7.1 G/DL (ref 5.7–8.2)
PROT UR-MCNC: NEGATIVE MG/DL
RBC # BLD AUTO: 5.2 X10(6)UL
SODIUM SERPL-SCNC: 143 MMOL/L (ref 136–145)
SP GR UR STRIP: 1.02 (ref 1–1.03)
TRIGL SERPL-MCNC: 60 MG/DL (ref 30–149)
TSI SER-ACNC: 4.17 UIU/ML (ref 0.55–4.78)
UROBILINOGEN UR STRIP-ACNC: NORMAL
VLDLC SERPL CALC-MCNC: 9 MG/DL (ref 0–30)
WBC # BLD AUTO: 5.4 X10(3) UL (ref 4–11)

## 2024-12-03 PROCEDURE — 36415 COLL VENOUS BLD VENIPUNCTURE: CPT

## 2024-12-03 PROCEDURE — 80053 COMPREHEN METABOLIC PANEL: CPT

## 2024-12-03 PROCEDURE — 80061 LIPID PANEL: CPT

## 2024-12-03 PROCEDURE — 81003 URINALYSIS AUTO W/O SCOPE: CPT

## 2024-12-03 PROCEDURE — 84443 ASSAY THYROID STIM HORMONE: CPT

## 2024-12-03 PROCEDURE — 85025 COMPLETE CBC W/AUTO DIFF WBC: CPT

## 2024-12-03 PROCEDURE — 99395 PREV VISIT EST AGE 18-39: CPT | Performed by: FAMILY MEDICINE

## 2024-12-03 NOTE — PROGRESS NOTES
Subjective:     Patient ID: Kash Azul is a 23 year old male.    This patient is a 23-year-old gentleman who presents to the clinic accompanied by his mother for routine physical, but there is a myriad of concerns expressed by the patient and also expressed by his mother.    There is a history of behavioral health challenges and this will be addressed thoroughly once the physical exam and the results of his organic tests have been reviewed/resulted.  The plan would be to refer the patient to our behavioral health counselor.    The patient begins with a complaint of consistent loose stool with occasional mucous visualized. No bloating. No excessive gas. Gastric irritation with alcohol intake. Allergic to several fruits. Diminished appetite.    Patient makes admission to a large degree of stress and also there is an admission for consistent alcohol use.    Patient declines recommended HPV and influenza vaccines for now.    Patient has a chronic low back complaint which has been evaluated in the past.  During exam we were able to determine that the patient has a short left leg with a compensatory scoliosis.  He therefore has a chronic pelvic tilt and likely the source of his chronic pain.  Patient will be referred to physiatry for further assessment.        History/Other:   Review of Systems  Current Outpatient Medications   Medication Sig Dispense Refill    EPINEPHrine (EPIPEN 2-MARCIA) 0.3 MG/0.3ML Injection Solution Auto-injector inject 1 by Intramuscular route 2 each 0     Allergies:Allergies[1]    Past Medical History:    Allergic rhinitis    Peanut allergy    Scoliosis      No past surgical history on file.   No family history on file.   Social History:   Social History     Socioeconomic History    Marital status: Single   Tobacco Use    Smoking status: Never    Smokeless tobacco: Never   Vaping Use    Vaping status: Never Used   Substance and Sexual Activity    Alcohol use: Yes    Drug use: Yes     Types:  Cannabis    Sexual activity: Yes     Partners: Female   Other Topics Concern    Caffeine Concern Yes     Comment: soda, 1cup/day        Objective:   Vitals:    12/03/24 1402   BP: 116/74   Pulse: 55   Resp: 18   Temp: 97.9 °F (36.6 °C)       Physical Exam  Musculoskeletal:      Thoracic back: Spasms and tenderness present. Decreased range of motion. Scoliosis present.      Lumbar back: Spasms and tenderness present. Decreased range of motion. Scoliosis present.        Back:       Comments: Short left leg with compensatory thoracolumbar scoliosis.  Region of chronic pain as depicted and red and compensatory scoliosis as depicted with black lines.         Assessment & Plan:   1. Routine physical examination  The following labs have been ordered.  - CBC With Differential With Platelet; Future  - Lipid Panel; Future  - Comp Metabolic Panel (14); Future  - TSH W Reflex To Free T4; Future  - Urinalysis, Routine; Future    2. Influenza vaccination declined by patient  Declined by patient.  - Fluzone trivalent vaccine, PF 0.5mL, 6mo+ ([id number])    3. Loose stools  Ordered.  - H. Pylori Stool Ag, EIA [E]; Future    4. Epigastric pain  Ordered.  - H. Pylori Stool Ag, EIA [E]; Future    5. Chronic midline low back pain without sciatica  Referred.  - Physiatry Referral - In Network    6. Scoliosis of thoracolumbar spine, unspecified scoliosis type  Referred.  - Physiatry Referral - In Network    7. Lipid screening  See #1.  - Lipid Panel; Future    8. Fatigue, unspecified type  Workup initiated.  - CBC With Differential With Platelet; Future  - Comp Metabolic Panel (14); Future  - TSH W Reflex To Free T4; Future  - Urinalysis, Routine; Future    9. Shortening, leg, congenital, left  To physiatry.      No orders of the defined types were placed in this encounter.      Meds This Visit:  Requested Prescriptions      No prescriptions requested or ordered in this encounter       Imaging & Referrals:  INFLUENZA VACCINE, TRI, PRESERV  FREE, 0.5 ML     Patient Instructions   All adult screening ordered and done appropriate for patient's age and gender and risk factors and complaints.  Monitor blood pressures and record at home. Limit salt intake.  Patient being referred to physiatry regarding short left leg along with congenital compensating scoliosis and chronic low back pain without radiculopathy.  We will hold on influenza vaccine for now.  H. pylori test recommended.      Return in about 1 year (around 12/3/2025), or if symptoms worsen or fail to improve.           [1]   Allergies  Allergen Reactions    Banana      Other reaction(s): BANANA    Mold UNKNOWN    Peanuts HIVES    Seasonal ITCHING

## 2024-12-03 NOTE — PATIENT INSTRUCTIONS
All adult screening ordered and done appropriate for patient's age and gender and risk factors and complaints.  Monitor blood pressures and record at home. Limit salt intake.  Patient being referred to physiatry regarding short left leg along with congenital compensating scoliosis and chronic low back pain without radiculopathy.  We will hold on influenza vaccine for now.  H. pylori test recommended.

## 2024-12-05 DIAGNOSIS — R79.89 ELEVATED TSH: Primary | ICD-10-CM

## 2024-12-05 DIAGNOSIS — R77.9 ELEVATED SERUM PROTEIN LEVEL: ICD-10-CM

## 2025-01-29 ENCOUNTER — TELEPHONE (OUTPATIENT)
Dept: CASE MANAGEMENT | Age: 25
End: 2025-01-29

## 2025-01-29 DIAGNOSIS — L30.9 ECZEMA, UNSPECIFIED TYPE: Primary | ICD-10-CM

## 2025-01-29 NOTE — TELEPHONE ENCOUNTER
Dr. Greer    Mom requesting referral to dermatologist, Dr. Judith Topete    Patient has Medicaid and this is Order Only.    Please have staff fax order to: [phone]    Pended referral please review diagnosis and sign off if you agree.    Thank you.  Marva Prado  [facility]

## 2025-01-30 NOTE — TELEPHONE ENCOUNTER
Please make for sure that this particular dermatologist is in this patient's network.  I cannot sign this referral as it is.  The POS/ and provider boxes have to be completed.  When I try to put the specialist's name and these slots, the provider does not show up.

## 2025-02-07 NOTE — TELEPHONE ENCOUNTER
Dr Wagoner,      Pended new referral.      Patient has Medicaid.     This is order only, please have staff fax order to: Fax [phone]    Pended referral please review diagnosis and sign off if you agree.    Thank you.  Marva Prado  [facility]

## 2025-04-19 ENCOUNTER — TELEPHONE (OUTPATIENT)
Dept: FAMILY MEDICINE CLINIC | Facility: CLINIC | Age: 25
End: 2025-04-19

## 2025-04-19 NOTE — TELEPHONE ENCOUNTER
Dax called on behalf of the patient asking if they can bring in the stool sample on the day before of the appointment on 4/21. Dax would like to know how far in advance is the stool sample needed.

## 2025-04-21 NOTE — TELEPHONE ENCOUNTER
Called patient's mother, confirmed patient's name and .      Mom asking how long it take to get results for H pylori stool test and if they should reschedule appointment for tomorrow.  Advised mom to keep appointment and bring stool sample tomorrow. Mom in agreement.

## 2025-04-22 ENCOUNTER — LAB ENCOUNTER (OUTPATIENT)
Dept: LAB | Age: 25
End: 2025-04-22
Attending: FAMILY MEDICINE
Payer: MEDICAID

## 2025-04-22 ENCOUNTER — OFFICE VISIT (OUTPATIENT)
Dept: FAMILY MEDICINE CLINIC | Facility: CLINIC | Age: 25
End: 2025-04-22

## 2025-04-22 VITALS
HEIGHT: 69 IN | OXYGEN SATURATION: 97 % | BODY MASS INDEX: 23.85 KG/M2 | TEMPERATURE: 98 F | HEART RATE: 60 BPM | WEIGHT: 161 LBS | SYSTOLIC BLOOD PRESSURE: 109 MMHG | RESPIRATION RATE: 18 BRPM | DIASTOLIC BLOOD PRESSURE: 67 MMHG

## 2025-04-22 DIAGNOSIS — M41.9 SCOLIOSIS, UNSPECIFIED SCOLIOSIS TYPE, UNSPECIFIED SPINAL REGION: ICD-10-CM

## 2025-04-22 DIAGNOSIS — R19.5 LOOSE STOOLS: ICD-10-CM

## 2025-04-22 DIAGNOSIS — Q72.812 SHORTENING, LEG, CONGENITAL, LEFT: ICD-10-CM

## 2025-04-22 DIAGNOSIS — R10.13 EPIGASTRIC PAIN: ICD-10-CM

## 2025-04-22 DIAGNOSIS — G89.29 CHRONIC MIDLINE LOW BACK PAIN WITHOUT SCIATICA: ICD-10-CM

## 2025-04-22 DIAGNOSIS — M54.50 CHRONIC MIDLINE LOW BACK PAIN WITHOUT SCIATICA: ICD-10-CM

## 2025-04-22 DIAGNOSIS — K52.9 CHRONIC DIARRHEA: Primary | ICD-10-CM

## 2025-04-22 PROCEDURE — 87338 HPYLORI STOOL AG IA: CPT

## 2025-04-22 PROCEDURE — 99214 OFFICE O/P EST MOD 30 MIN: CPT | Performed by: FAMILY MEDICINE

## 2025-04-22 NOTE — PATIENT INSTRUCTIONS
Standby consult to GI placed on the chart.  Encouraged safe physical fitness and daily physical activity daily.  Recommend physiatry evaluation.  Consider medicinal cannabis.

## 2025-04-24 LAB — H PYLORI AG STL QL IA: NEGATIVE

## 2025-04-25 NOTE — PROGRESS NOTES
Subjective:     Patient ID: Kash Azul is a 24 year old male.    The patient is a gentleman who is doing a follow-up regarding his last visit where he had persistent diarrhea which has improved.  Patient admits that his alcohol consumption has greatly decreased.  Patient is being referred to GI in order to rule out IBS so IBD.    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  Patient has chronic low back discomfort.            History/Other:   Review of Systems  Current Medications[1]  Allergies:Allergies[2]    Past Medical History[3]   Past Surgical History[4]   Family History[5]   Social History: Short Social Hx on File[6]     Objective:   Vitals:    04/22/25 1348   BP: 109/67   Pulse: 60   Resp: 18   Temp: 98 °F (36.7 °C)       Physical Exam  Constitutional:       General: He is not in acute distress.     Appearance: Normal appearance. He is not ill-appearing.   Cardiovascular:      Rate and Rhythm: Normal rate and regular rhythm.      Heart sounds:      No gallop.   Pulmonary:      Effort: Pulmonary effort is normal.      Breath sounds: Normal breath sounds.   Abdominal:      General: Bowel sounds are normal. There is no distension.      Palpations: Abdomen is soft. There is no mass.   Musculoskeletal:      Comments: Left short leg with compensatory thoracolumbar scoliosis.   Neurological:      Mental Status: He is alert.         Assessment & Plan:   1. Chronic diarrhea  Referred for formal workup to rule out inflammatory and/or irritable bowel syndrome.  - Gastro Referral - [place] ([place])    2. Chronic midline low back pain without sciatica  Referred.  - [facility]iatry Referral - [facility]    3.  Shortening, leg, congenital, left  Referred.  - Physiatry Referral - [facility]    4. Scoliosis, unspecified scoliosis type, unspecified spinal region  Referred.  - Physiatry Referral - [facility]      No orders of the defined types were placed in this encounter.      Meds This Visit:  Requested Prescriptions      No prescriptions requested or ordered in this encounter       Imaging & Referrals:  GASTRO - INTERNAL  PHYSIATRY - INTERNAL     Patient Instructions   Standby consult to GI placed on the chart.  Encouraged safe physical fitness and daily physical activity daily.  Recommend physiatry evaluation.  Consider medicinal cannabis.    Return in about 3 months (around 7/22/2025), or if symptoms worsen or fail to improve.         [1]   Current Outpatient Medications   Medication Sig Dispense Refill    EPINEPHrine (EPIPEN 2-MARCIA) 0.3 MG/0.3ML Injection Solution Auto-injector inject 1 by Intramuscular route 2 each 0   [2]   Allergies  Allergen Reactions    Banana      Other reaction(s): BANANA    Mold UNKNOWN    Peanuts HIVES    Seasonal ITCHING   [3]   Past Medical History:   Allergic rhinitis    Peanut allergy    Scoliosis   [4] History reviewed. No pertinent surgical history.  [5] History reviewed. No pertinent family history.  [6]   Social History  Socioeconomic History    Marital status: Single   Tobacco Use    Smoking status: Never    Smokeless tobacco: Never   Vaping Use    Vaping status: Never Used   Substance and Sexual Activity    Alcohol use: Yes    Drug use: Yes     Types: Cannabis    Sexual activity: Yes     Partners: Female   Other Topics Concern    Caffeine Concern Yes     Comment: soda, 1cup/day

## (undated) NOTE — Clinical Note
3/30/2017              [id number] [id number]         To whom it may concern,      Wilder Galicia was seen in the office today for right heel injury.   Please allow him to wear athletic shoes in school for the next few

## (undated) NOTE — MR AVS SNAPSHOT
[facility]  [address], [address]  [phone]               Thank you for choosing us for your health care visit with Shane Garcia MD.  We are glad to serve you and happy to provide you with this summary Enter your [employer] Activation Code exactly as it appears below along with your Zip Code and Date of Birth to complete the sign-up process. If you do not sign up before the expiration date, you must request a new code.     Your unique [employer] Access Code: [email or web address]